# Patient Record
Sex: FEMALE | Race: BLACK OR AFRICAN AMERICAN | Employment: FULL TIME | URBAN - METROPOLITAN AREA
[De-identification: names, ages, dates, MRNs, and addresses within clinical notes are randomized per-mention and may not be internally consistent; named-entity substitution may affect disease eponyms.]

---

## 2020-08-26 ENCOUNTER — APPOINTMENT (OUTPATIENT)
Dept: GENERAL RADIOLOGY | Age: 40
End: 2020-08-26
Payer: COMMERCIAL

## 2020-08-26 ENCOUNTER — HOSPITAL ENCOUNTER (EMERGENCY)
Age: 40
Discharge: HOME | End: 2020-08-26
Attending: EMERGENCY MEDICINE
Payer: COMMERCIAL

## 2020-08-26 VITALS
OXYGEN SATURATION: 96 % | TEMPERATURE: 97.7 F | RESPIRATION RATE: 17 BRPM | WEIGHT: 188 LBS | BODY MASS INDEX: 32.27 KG/M2 | HEART RATE: 86 BPM | SYSTOLIC BLOOD PRESSURE: 151 MMHG | DIASTOLIC BLOOD PRESSURE: 90 MMHG

## 2020-08-26 LAB
ANION GAP SERPL CALCULATED.3IONS-SCNC: 12 MMOL/L (ref 3–16)
BASOPHILS ABSOLUTE: 0 K/UL (ref 0–0.2)
BASOPHILS RELATIVE PERCENT: 0.5 %
BUN BLDV-MCNC: 14 MG/DL (ref 7–20)
CALCIUM SERPL-MCNC: 9.8 MG/DL (ref 8.3–10.6)
CHLORIDE BLD-SCNC: 102 MMOL/L (ref 99–110)
CO2: 24 MMOL/L (ref 21–32)
CREAT SERPL-MCNC: 0.7 MG/DL (ref 0.6–1.1)
EOSINOPHILS ABSOLUTE: 0.2 K/UL (ref 0–0.6)
EOSINOPHILS RELATIVE PERCENT: 2.7 %
GFR AFRICAN AMERICAN: >60
GFR NON-AFRICAN AMERICAN: >60
GLUCOSE BLD-MCNC: 132 MG/DL (ref 70–99)
HCG QUALITATIVE: NEGATIVE
HCT VFR BLD CALC: 41.4 % (ref 36–48)
HEMOGLOBIN: 14 G/DL (ref 12–16)
INR BLD: 1.09 (ref 0.86–1.14)
LYMPHOCYTES ABSOLUTE: 2.8 K/UL (ref 1–5.1)
LYMPHOCYTES RELATIVE PERCENT: 47.6 %
MAGNESIUM: 2.1 MG/DL (ref 1.8–2.4)
MCH RBC QN AUTO: 26.3 PG (ref 26–34)
MCHC RBC AUTO-ENTMCNC: 33.8 G/DL (ref 31–36)
MCV RBC AUTO: 78 FL (ref 80–100)
MONOCYTES ABSOLUTE: 0.3 K/UL (ref 0–1.3)
MONOCYTES RELATIVE PERCENT: 5.4 %
NEUTROPHILS ABSOLUTE: 2.6 K/UL (ref 1.7–7.7)
NEUTROPHILS RELATIVE PERCENT: 43.8 %
PDW BLD-RTO: 14.4 % (ref 12.4–15.4)
PLATELET # BLD: 248 K/UL (ref 135–450)
PMV BLD AUTO: 7.5 FL (ref 5–10.5)
POTASSIUM SERPL-SCNC: 3.4 MMOL/L (ref 3.5–5.1)
PROTHROMBIN TIME: 12.7 SEC (ref 10–13.2)
RBC # BLD: 5.31 M/UL (ref 4–5.2)
SODIUM BLD-SCNC: 138 MMOL/L (ref 136–145)
TROPONIN: <0.01 NG/ML
WBC # BLD: 5.9 K/UL (ref 4–11)

## 2020-08-26 PROCEDURE — 84484 ASSAY OF TROPONIN QUANT: CPT

## 2020-08-26 PROCEDURE — 84703 CHORIONIC GONADOTROPIN ASSAY: CPT

## 2020-08-26 PROCEDURE — 36415 COLL VENOUS BLD VENIPUNCTURE: CPT

## 2020-08-26 PROCEDURE — 99285 EMERGENCY DEPT VISIT HI MDM: CPT

## 2020-08-26 PROCEDURE — 83735 ASSAY OF MAGNESIUM: CPT

## 2020-08-26 PROCEDURE — 84443 ASSAY THYROID STIM HORMONE: CPT

## 2020-08-26 PROCEDURE — 6370000000 HC RX 637 (ALT 250 FOR IP): Performed by: EMERGENCY MEDICINE

## 2020-08-26 PROCEDURE — 71046 X-RAY EXAM CHEST 2 VIEWS: CPT

## 2020-08-26 PROCEDURE — 85025 COMPLETE CBC W/AUTO DIFF WBC: CPT

## 2020-08-26 PROCEDURE — 80048 BASIC METABOLIC PNL TOTAL CA: CPT

## 2020-08-26 PROCEDURE — 93005 ELECTROCARDIOGRAM TRACING: CPT | Performed by: EMERGENCY MEDICINE

## 2020-08-26 PROCEDURE — 85610 PROTHROMBIN TIME: CPT

## 2020-08-26 RX ORDER — POTASSIUM CHLORIDE 750 MG/1
40 TABLET, FILM COATED, EXTENDED RELEASE ORAL ONCE
Status: COMPLETED | OUTPATIENT
Start: 2020-08-26 | End: 2020-08-26

## 2020-08-26 RX ADMIN — POTASSIUM CHLORIDE 40 MEQ: 750 TABLET, FILM COATED, EXTENDED RELEASE ORAL at 21:14

## 2020-08-26 ASSESSMENT — ENCOUNTER SYMPTOMS
CHEST TIGHTNESS: 0
CONSTIPATION: 0
SHORTNESS OF BREATH: 0
VOMITING: 0
DIARRHEA: 0
RESPIRATORY NEGATIVE: 1
COUGH: 0
BACK PAIN: 0
ABDOMINAL PAIN: 0
NAUSEA: 0
COLOR CHANGE: 0

## 2020-08-27 LAB
EKG ATRIAL RATE: 100 BPM
EKG DIAGNOSIS: NORMAL
EKG P AXIS: 76 DEGREES
EKG P-R INTERVAL: 156 MS
EKG Q-T INTERVAL: 360 MS
EKG QRS DURATION: 82 MS
EKG QTC CALCULATION (BAZETT): 464 MS
EKG R AXIS: 63 DEGREES
EKG T AXIS: 52 DEGREES
EKG VENTRICULAR RATE: 100 BPM
TSH REFLEX: 2.27 UIU/ML (ref 0.27–4.2)

## 2020-08-27 PROCEDURE — 93010 ELECTROCARDIOGRAM REPORT: CPT | Performed by: INTERNAL MEDICINE

## 2020-08-27 NOTE — ED PROVIDER NOTES
I independently performed a history and physical on Narciso Chavez Obi. All diagnostic, treatment, and disposition decisions were made by myself in conjunction with the advanced practice provider. I have participated in the medical decision making and directed the treatment plan and disposition of the patient. For further details of [facility] emergency department encounter, please see the advanced practice provider's documentation. CHIEF COMPLAINT  Chief Complaint   Patient presents with    Palpitations     pt states \"my heart is skipping beats\". pt denies any sob or pain. Briefly, Judy Dick is a 47 y.o. female  who presents to the ED complaining of palpitations, not really having any chest discomfort or pain, and also not having any shortness of breath coughing or fevers though. She says it has been about 5 days or maybe a little more. No lightheadedness or syncope. No abdominal pain vomiting or diarrhea. FOCUSED PHYSICAL EXAMINATION  BP (!) 151/90   Pulse 86   Temp 97.7 °F (36.5 °C) (Temporal)   Resp 17   Wt 188 lb (85.3 kg)   LMP 08/24/2020   SpO2 96%   BMI 32.27 kg/m²    Focused physical examination notable for no acute distress, well-appearing, well-nourished, normal speech and mentation without obvious facial droop, no obvious rash. No obvious cranial nerve deficits on my initial exam.  Regular rate and rhythm, clear to auscultation bilaterally. No PVCs visualized on the monitor during my assessment of her. No murmurs rubs or gallops on my exam.  No peripheral edema. Abdomen is soft nontender nondistended.     The 12 lead EKG was interpreted by me as follows:  Rate: normal with a rate of 100  Rhythm: sinus  Axis: normal  Intervals: normal LA, narrow QRS, normal QTc  ST segments: no ST elevations or depressions  T waves: no abnormal inversions  Non-specific T wave changes: not present  Prior EKG comparison: No prior is currently available for comparison    MDM:  Diagnostic considerations included acute coronary syndrome, pulmonary embolism, COPD/asthma, pneumonia, musculoskeletal, reflux/PUD/gastritis, pneumothorax, CHF, thoracic aortic dissection, anxiety    ED course was notable for mild hypokalemia likely incidental but the patient does have some symptomatic palpitations. Thyroid studies were sent but pending at the time of disposition. Advised follow-up with her primary care and consideration of cardiology referral and/or Holter monitoring given her symptoms are persistent and intermittent. She does not strike me as anxious and says that she is not anxious. Her troponin is negative, reassuring vitals and reassuring blood work otherwise. EKG is unremarkable. Follow-up with primary care. Chest x-ray is clear. Symptoms are inconsistent with pulmonary embolism. During the patient's ED course, the patient was given:  Medications   potassium chloride (KLOR-CON) extended release tablet 40 mEq (40 mEq Oral Given 8/26/20 2114)        CLINICAL IMPRESSION  1. Palpitations    2. Hypokalemia        DISPOSITION  Narciso Madrid was discharged to home in stable condition. I have discussed the findings of today's workup with the patient and addressed the patient's questions and concerns. Important warning signs as well as new or worsening symptoms which would necessitate immediate return to the ED were discussed. The plan is to discharge from the ED at this time, and the patient is in stable condition. The patient acknowledged understanding is agreeable with this plan. Follow-up with:  Your PCP at [address] [place] [address] Rd an appointment as soon as possible for a visit in 2 days  For symptom re-evaluation    [facility] Emergency Department  [address] [facility]  [address] [facility] [address] [facility]  Go to   If symptoms worsen      This chart was created using Dragon dictation software.   Efforts were made by me to ensure accuracy, however some errors may be present due to limitations of this technology.             Mark Albright MD  08/26/20 6023

## 2020-08-27 NOTE — ED PROVIDER NOTES
[facility]        Pt Name: Jeffery Krause  MRN: [id number]  [place] 1980  Date of evaluation: 8/26/2020  Provider: ERIS Abdalla  PCP: No primary care provider on file. I have seen and evaluated this patient with my supervising physician [facility]       Chief Complaint   Patient presents with    Palpitations     pt states \"my heart is skipping beats\". pt denies any sob or pain. HISTORY OF PRESENT ILLNESS   (Location, Timing/Onset, Context/Setting, Quality, Duration, Modifying Factors, Severity, Associated Signs and Symptoms)  Note limiting factors. Jeffery Krause is a 47 y.o. female with past medical history of hypertension who presents to the ED with complaint of palpitations. Patient states she feels her heart is skipping beats. Patient states it occurs once every 3 to 4 minutes and has been constant at this rate for the past 5 days. Denies history of similar symptoms in the past.  Patient denies any changes of medications. Patient denies any recent travel, trips, surgery or immobilization. Denies lightheadedness, dizziness, headache, chest pain, shortness of breath, pleuritic pain, orthopnea, pedal edema or calf tenderness. Denies fever chills. Denies rashes or lesions. Denies any history of thyroid problems. Patient states this started when she was drinking Sprite a couple days ago. Denies any significant caffeine usage. Denies energy drinks or energy supplement usage. Denies any changes in medication or diet. Became concerned due to persistent symptoms and came to the ED for further evaluation treatment. Nursing Notes were all reviewed and agreed with or any disagreements were addressed in the HPI.     REVIEW OF SYSTEMS    (2-9 systems for level 4, 10 or more for level 5)     Review of Systems   Constitutional: Negative for activity change, appetite change, chills, diaphoresis, fatigue and fever. Respiratory: Negative. Negative for cough, chest tightness and shortness of breath. Cardiovascular: Positive for palpitations. Negative for chest pain and leg swelling. Gastrointestinal: Negative for abdominal pain, constipation, diarrhea, nausea and vomiting. Genitourinary: Negative for decreased urine volume, difficulty urinating, dysuria, flank pain, frequency, hematuria and urgency. Musculoskeletal: Negative for arthralgias, back pain, myalgias, neck pain and neck stiffness. Skin: Negative for color change, pallor, rash and wound. Neurological: Negative for dizziness, light-headedness and headaches. Positives and Pertinent negatives as per HPI. Except as noted above in the ROS, all other systems were reviewed and negative. PAST MEDICAL HISTORY     Past Medical History:   Diagnosis Date    Hypertension          SURGICAL HISTORY     Past Surgical History:   Procedure Laterality Date    BREAST CYST EXCISION      benign    BREAST LUMPECTOMY      bilat cysts     SECTION, LOW TRANSVERSE           [address]       Discharge Medication List as of 2020  9:11 PM      CONTINUE these medications which have NOT CHANGED    Details   emtricitabine-tenofovir (TRUVADA) 200-300 MG per tablet Take 1 tablet by mouth dailyHistorical Med      cetirizine (ZYRTEC) 10 MG tablet Take 10 mg by mouth dailyHistorical Med      lisinopril (PRINIVIL;ZESTRIL) 20 MG tablet Take 20 mg by mouth daily      metoprolol (LOPRESSOR) 25 MG tablet Take 25 mg by mouth 2 times daily. fluticasone (FLONASE) 50 MCG/ACT nasal spray 1 spray by Nasal route daily. ibuprofen (ADVIL;MOTRIN) 800 MG tablet Take 1 tablet by mouth every 8 hours as needed for Pain., Disp-20 tablet, R-0      hydrochlorothiazide (HYDRODIURIL) 25 MG tablet Take 1 tablet by mouth daily. , Disp-30 tablet, R-0      Prenatal Vit-Fe Fumarate-FA (PRENATAL MULTIVITAMIN) 27-1 MG TABS tablet Take 1 tablet by mouth daily. , Disp-30 tablet, R-11               ALLERGIES     Patient has no known allergies. FAMILYHISTORY     History reviewed. No pertinent family history. SOCIAL HISTORY       Social History     Tobacco Use    Smoking status: Never Smoker    Smokeless tobacco: Never Used   Substance Use Topics    Alcohol use: No    Drug use: No       SCREENINGS             PHYSICAL EXAM    (up to 7 for level 4, 8 or more for level 5)     ED Triage Vitals [08/26/20 1931]   BP Temp Temp Source Pulse Resp SpO2 Height Weight   (!) 147/96 97.7 °F (36.5 °C) Temporal 91 17 100 % -- 188 lb (85.3 kg)       Physical Exam  Constitutional:       General: She is not in acute distress. Appearance: Normal appearance. She is well-developed. She is not ill-appearing, toxic-appearing or diaphoretic. HENT:      Head: Normocephalic and atraumatic. Right Ear: External ear normal.      Left Ear: External ear normal.   Eyes:      General:         Right eye: No discharge. Left eye: No discharge. Neck:      Musculoskeletal: Normal range of motion and neck supple. Cardiovascular:      Rate and Rhythm: Normal rate and regular rhythm. Pulses: Normal pulses. Heart sounds: Normal heart sounds. No murmur. No friction rub. No gallop. Comments: 2+ radial pulses bilaterally. No pedal edema. No calf tenderness. No JVD. Pulmonary:      Effort: Pulmonary effort is normal. No respiratory distress. Breath sounds: Normal breath sounds. No stridor. No wheezing, rhonchi or rales. Chest:      Chest wall: No tenderness. Abdominal:      General: Abdomen is flat. There is no distension. Palpations: Abdomen is soft. There is no mass. Tenderness: There is no abdominal tenderness. There is no right CVA tenderness, left CVA tenderness, guarding or rebound. Hernia: No hernia is present. Musculoskeletal: Normal range of motion. Skin:     General: Skin is warm and dry.       Coloration: findings:        Interpretation per the Radiologist below, if available at the time of this note:    XR CHEST (2 VW)   Final Result   No acute disease. Xr Chest (2 Vw)    Result Date: 8/26/2020  EXAMINATION: TWO XRAY VIEWS OF THE CHEST 8/26/2020 7:44 pm COMPARISON: None available HISTORY: ORDERING SYSTEM PROVIDED HISTORY: palpitations TECHNOLOGIST PROVIDED HISTORY: Reason for exam:->palpitations Reason for Exam: (pt states \"my heart is skipping beats\". pt denies any sob or pain. Acuity: Acute Type of Exam: Initial FINDINGS: No acute bony abnormality. The heart size and mediastinal contours are normal.  The lungs are clear. No acute disease. PROCEDURES   Unless otherwise noted below, none     Procedures    CRITICAL CARE TIME   N/A    CONSULTS:  None      EMERGENCY DEPARTMENT COURSE and DIFFERENTIAL DIAGNOSIS/MDM:   Vitals:    Vitals:    08/26/20 1931 08/26/20 2030   BP: (!) 147/96 (!) 151/90   Pulse: 91 86   Resp: 17 17   Temp: 97.7 °F (36.5 °C)    TempSrc: Temporal    SpO2: 100% 96%   Weight: 188 lb (85.3 kg)        Patient was given the following medications:  Medications   potassium chloride (KLOR-CON) extended release tablet 40 mEq (40 mEq Oral Given 8/26/20 2114)           Patient is a 80-year-old female who presents to the implant palpitations. Palpitations for the past 5 days. EKG obtained interpreted by attending. Afebrile stable vital signs. BMP showed potassium 3.4 and given oral replacement here in the ED. Troponin normal.  CBC showed normal white count, hemoglobin and platelets. Pregnancy negative. INR normal.  Magnesium normal.  TSH pending. Chest x-ray unremarkable. Suffering from palpitations. Follow-up with PCP. Return ED for any worsening symptoms. Low suspicion for ACS, PE, dissection, AAA, pneumonia, pneumothorax respiratory distress, GERD, anxiety, CHF, significant electrolyte abnormality, cardiac arrhythmia or other emergent etiology at this time.     FINAL IMPRESSION      1. Palpitations    2.  Hypokalemia          DISPOSITION/PLAN   DISPOSITION Decision To Discharge 08/26/2020 08:58:41 PM      PATIENT REFERREDTO:  Your PCP at [facility] [address] an appointment as soon as possible for a visit in 2 days  For symptom re-evaluation    [facility] Emergency Department  [facility] [address]  [phone]  Go to   If symptoms worsen      DISCHARGE MEDICATIONS:  Discharge Medication List as of 8/26/2020  9:11 PM          DISCONTINUED MEDICATIONS:  Discharge Medication List as of 8/26/2020  9:11 PM                 (Please note that portions of this note were completed with a voice recognition program.  Efforts were made to edit the dictations but occasionally words are mis-transcribed.)    ERIS Ellison (electronically signed)          ERIS Haywood  08/26/20 9094